# Patient Record
Sex: FEMALE | Race: WHITE | URBAN - METROPOLITAN AREA
[De-identification: names, ages, dates, MRNs, and addresses within clinical notes are randomized per-mention and may not be internally consistent; named-entity substitution may affect disease eponyms.]

---

## 2020-02-21 ENCOUNTER — RECORDS - HEALTHEAST (OUTPATIENT)
Dept: LAB | Facility: CLINIC | Age: 35
End: 2020-02-21

## 2020-02-21 LAB
ALBUMIN UR-MCNC: NEGATIVE MG/DL
ANTIBODY SCREEN: NEGATIVE
APPEARANCE UR: CLEAR
BASOPHILS # BLD AUTO: 0 THOU/UL (ref 0–0.2)
BASOPHILS NFR BLD AUTO: 0 % (ref 0–2)
BILIRUB UR QL STRIP: NEGATIVE
COLOR UR AUTO: YELLOW
EOSINOPHIL # BLD AUTO: 0.2 THOU/UL (ref 0–0.4)
EOSINOPHIL NFR BLD AUTO: 2 % (ref 0–6)
ERYTHROCYTE [DISTWIDTH] IN BLOOD BY AUTOMATED COUNT: 13.3 % (ref 11–14.5)
GLUCOSE UR STRIP-MCNC: NEGATIVE MG/DL
HCT VFR BLD AUTO: 42.8 % (ref 35–47)
HGB BLD-MCNC: 14.4 G/DL (ref 12–16)
HGB UR QL STRIP: NEGATIVE
HIV 1+2 AB+HIV1 P24 AG SERPL QL IA: NEGATIVE
KETONES UR STRIP-MCNC: NEGATIVE MG/DL
LEUKOCYTE ESTERASE UR QL STRIP: NEGATIVE
LYMPHOCYTES # BLD AUTO: 3.1 THOU/UL (ref 0.8–4.4)
LYMPHOCYTES NFR BLD AUTO: 30 % (ref 20–40)
MCH RBC QN AUTO: 29.8 PG (ref 27–34)
MCHC RBC AUTO-ENTMCNC: 33.6 G/DL (ref 32–36)
MCV RBC AUTO: 88 FL (ref 80–100)
MONOCYTES # BLD AUTO: 0.8 THOU/UL (ref 0–0.9)
MONOCYTES NFR BLD AUTO: 8 % (ref 2–10)
NEUTROPHILS # BLD AUTO: 6 THOU/UL (ref 2–7.7)
NEUTROPHILS NFR BLD AUTO: 59 % (ref 50–70)
NITRATE UR QL: NEGATIVE
PH UR STRIP: 5.5 [PH] (ref 4.5–8)
PLATELET # BLD AUTO: 343 THOU/UL (ref 140–440)
PMV BLD AUTO: 10 FL (ref 8.5–12.5)
RBC # BLD AUTO: 4.84 MILL/UL (ref 3.8–5.4)
SP GR UR STRIP: 1.02 (ref 1–1.03)
UROBILINOGEN UR STRIP-ACNC: NORMAL
WBC: 10.1 THOU/UL (ref 4–11)

## 2020-02-22 LAB
ABO/RH(D): NORMAL
ABORH REPEAT: NORMAL
HBV SURFACE AG SERPL QL IA: NEGATIVE
T PALLIDUM AB SER QL: NEGATIVE

## 2020-02-23 LAB — HBA1C MFR BLD: 5.4 % (ref 4.2–6.1)

## 2020-02-24 LAB
25(OH)D3 SERPL-MCNC: 36.7 NG/ML (ref 30–80)
C TRACH DNA SPEC QL PROBE+SIG AMP: NEGATIVE
HCV AB SERPL QL IA: NEGATIVE
HSV1 IGG SERPL QL IA: NEGATIVE
HSV2 IGG SERPL QL IA: NEGATIVE
N GONORRHOEA DNA SPEC QL NAA+PROBE: NEGATIVE

## 2020-05-21 ENCOUNTER — PRE VISIT (OUTPATIENT)
Dept: MATERNAL FETAL MEDICINE | Facility: CLINIC | Age: 35
End: 2020-05-21

## 2020-05-21 ENCOUNTER — TELEPHONE (OUTPATIENT)
Dept: MATERNAL FETAL MEDICINE | Facility: CLINIC | Age: 35
End: 2020-05-21

## 2020-05-21 DIAGNOSIS — O35.12X0 TRISOMY 18 OF FETUS IN CURRENT PREGNANCY, SINGLE OR UNSPECIFIED FETUS: Primary | ICD-10-CM

## 2020-05-21 RX ORDER — PNV NO.95/FERROUS FUM/FOLIC AC 28MG-0.8MG
1 TABLET ORAL DAILY
COMMUNITY

## 2020-05-21 NOTE — TELEPHONE ENCOUNTER
Phone call to Alyson to discuss future appointments. Plan for fetal echo on 6/5 at 0900 in the echo lab, [facility], 1100 MF ultrasound (Dr. Johnson on service and will see), JAYMIE to [facility] midwives TBD, request for visit initiated. Pt agrees to plan. Writer will email map and directions for 6/5 visits.      Radha Ward RN    [id number] Follow up phone call to Alyson confirming visit for 6/5. Visitor policy reviewed. Email with map sent.      Radha Ward RN

## 2020-05-28 ENCOUNTER — TELEPHONE (OUTPATIENT)
Dept: OBGYN | Facility: CLINIC | Age: 35
End: 2020-05-28

## 2020-05-28 NOTE — TELEPHONE ENCOUNTER
----- Message from YOVANI Cisneros CNM sent at 5/28/2020 11:44 AM CDT -----  Hi,  This is a patient who has been seen at [facility] for US and new diagnosis of trisomy 18 and multiple fetal anomalies.  At this point, she wants palliative care and will continue to see Dr. Kaia Johnson for all [facility] USs going forward.  She wants midwifery care for prenatal care and birth.  She has appointments scheduled in the morning 6/5.  Perhaps a visit with Ana Rosa Mccormick on that day?   Can you also connect with the care coordinators at [facility]?    Thanks!  Laxmi

## 2020-05-28 NOTE — TELEPHONE ENCOUNTER
Call to Radha MISHRA at [facility] who confirmed pt is looking to continue prenatal care with [facility] midwives -- fetus with Trisomy 18 planning palliative care. Will coordinate JAYMIE appointment in clinic same day as [facility] (June 5th).    Call to patient to arrange but reached voicemail. Left message to call triage.

## 2020-06-05 ENCOUNTER — HOSPITAL ENCOUNTER (OUTPATIENT)
Dept: CARDIOLOGY | Facility: CLINIC | Age: 35
End: 2020-06-05
Attending: OBSTETRICS & GYNECOLOGY
Payer: COMMERCIAL

## 2020-06-05 ENCOUNTER — TELEPHONE (OUTPATIENT)
Dept: MATERNAL FETAL MEDICINE | Facility: CLINIC | Age: 35
End: 2020-06-05

## 2020-06-05 ENCOUNTER — HOSPITAL ENCOUNTER (OUTPATIENT)
Dept: ULTRASOUND IMAGING | Facility: CLINIC | Age: 35
End: 2020-06-05
Attending: OBSTETRICS & GYNECOLOGY
Payer: COMMERCIAL

## 2020-06-05 ENCOUNTER — OFFICE VISIT (OUTPATIENT)
Dept: OBGYN | Facility: CLINIC | Age: 35
End: 2020-06-05
Attending: MIDWIFE
Payer: COMMERCIAL

## 2020-06-05 ENCOUNTER — OFFICE VISIT (OUTPATIENT)
Dept: MATERNAL FETAL MEDICINE | Facility: CLINIC | Age: 35
End: 2020-06-05
Attending: OBSTETRICS & GYNECOLOGY
Payer: COMMERCIAL

## 2020-06-05 DIAGNOSIS — O35.8XX0 MULTIPLE CONGENITAL ANOMALIES OF FETUS ON PRENATAL ULTRASOUND: ICD-10-CM

## 2020-06-05 DIAGNOSIS — O35.12X0 TRISOMY 18 OF FETUS IN CURRENT PREGNANCY, SINGLE OR UNSPECIFIED FETUS: ICD-10-CM

## 2020-06-05 DIAGNOSIS — O35.12X0 FETAL TRISOMY 18 AFFECTING CARE OF MOTHER, ANTEPARTUM, SINGLE OR UNSPECIFIED FETUS: Primary | ICD-10-CM

## 2020-06-05 DIAGNOSIS — O09.892 ENCOUNTER FOR SUPERVISION OF HIGH RISK PREGNANCY DUE TO FETAL ANOMALY, SECOND TRIMESTER: ICD-10-CM

## 2020-06-05 PROBLEM — Z80.0 FAMILY HISTORY OF COLON CANCER: Status: ACTIVE | Noted: 2018-09-19

## 2020-06-05 PROCEDURE — 76820 UMBILICAL ARTERY ECHO: CPT | Performed by: OBSTETRICS & GYNECOLOGY

## 2020-06-05 PROCEDURE — G0463 HOSPITAL OUTPT CLINIC VISIT: HCPCS | Mod: ZF

## 2020-06-05 PROCEDURE — 76816 OB US FOLLOW-UP PER FETUS: CPT

## 2020-06-05 PROCEDURE — 93325 DOPPLER ECHO COLOR FLOW MAPG: CPT

## 2020-06-05 ASSESSMENT — MIFFLIN-ST. JEOR: SCORE: 1637.53

## 2020-06-05 NOTE — NURSING NOTE
Chief Complaint   Patient presents with     Prenatal Care     24w2d       See DAVION Oliveira 6/5/2020

## 2020-06-05 NOTE — PROGRESS NOTES
[facility]   Heart Center Fetal Consult Note    Patient:  Alyson Horowitz MRN:  [id number]   YOB: 1985 Age:  35 year old   Date of Visit:  2020 PCP:  No primary care provider on file.     Dear Dr. Johnson:    I had the pleasure of seeing Alyson Horowitz at the [facility] on 2020 in fetal cardiology consultation for fetal echocardiogram results. She presented today accompanied by herself. As you know, she is a 35 year old  at 24w2d who presented for fetal echocardiogram today because of trisomy 18 and suspected congenital heart disease.    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 Mildly hypoplastic left artioventricular valve (measures 0.44 cm with a z-score of -2.76). Mild to moderate right atrioventricular valve insufficiency. Large inlet ventricular septal defect with outlet extension and large primum defect. The right ventricle is mildly dilated with normal systolic function. The left ventricle has normal systolic function. Double outlet right ventricle with normally related great arteries ,subaortic conus, and mild to moderately hypoplastic aortic valve (measures 2.4 cm with a z-score of -3.9). The pulmonary valve leaflets are mildly thickend with a peak velocity of 1.5 m/sec and mild insufficiency. The aortic arch is likely interrupted versus severely hypoplastic transverse arch.     With the help of diagrams, I reviewed the echo findings today with Alyson Horowitz. I discussed the fetal cardiac anatomy, function, and physiology. In the setting of trisomy 18, I explained that each case is discussed in a multidisciplinary team to determine if surgical option is an option. This cardiac diagnosis would require surgery within the first week of life; and may require additional surgeries if this is a  staged approach. The prognosis of the surgery would be guarded in the setting of trisomy 18. At this time, based on my discussion with Alyson, she is gathering information of the overall wellbeing of her baby, and is most likely considering comfort care. I discussed the above findings with Dr. Johnson, and recommended no follow up at this time. If the family changes their mind from comfort care to wanting to evaluate for possible intervention, please let us know so we can schedule follow up. She had appropriate questions which I addressed. I provided her with my direct contact information for additional questions after they left.    Thank you for allowing me to participate in Alyson's care. Please do not hesitate to contact me with questions or concerns.    This visit was separate from the performance and interpretation of the ultrasound. The majority of the time (>50%) was spent in counseling and coordination of care. I spent approximately 55 minutes in face-to-face time reviewing the above considerations.    Rony Garcia M.D.  Pediatric Cardiology  [facility] [address], 5th floor, [facility] [address]  Phone [phone]  Fax [phone]

## 2020-06-05 NOTE — PROGRESS NOTES
+Transfer of Care  SUBJECTIVE  35 year old woman presents to clinic for transfer of OB care appointment.    Patient's last menstrual period was 2019.  at 24w2d by Estimated Date of Delivery: Sep 23, 2020 based on 6w1d ultrasound on 2020.     - Feels well overall physically. Had nausea and vomiting early pregnancy but is resolved.   - Pt was receiving prenatal care from [facility]. Initiated prenatal care at 9 weeks, has had 3 visit total. She did a home birth with this midwifery service in  and has a relationship with [facility]. If COVID restrictions change, midwife may be willing to provide emotional support during labor/birth.       - Reason for transfer to [facility] care- multiple fetal anomalies seen on ultrasound, trisomy 18 confirmed. Seeing Dr. Johnson at [facility], but would prefer midwifery birth.   - Pt had US at [facility] and echocardiogram today which provided insight into prognosis. States she plans to discuss results with , but they will likely plan palliative care.   -Level II Ultrasound abnormal results, follow up per [facility] for trisomy 18.   - Pre pregnancy BMI 29.87.   Pre Pregnancy Weight 185lbs.  Height 66 inches.    Paper prenatal records received via fax, reviewed. After review of prenatal records, additional routine orders are recommended including: Hep B antibody    US 3020: 6w1d intrauterine pregnancy   Labs 2020:  O positive  Adelaida screen: negative  HIV: non-reactive  Hep B Ag: non-reactive    Hep C Ab: non-reactive  Treponema: non-reactive   Chlamydia: negative  Gonorrhea: negative  HS1: negative  HSV2: negative  HgbA1C: 5.4  Vitamin D: 36  Hgb: 14.4    GENETICS  - Genetic/Infection questionnaire completed, risks include none. Pt  does not have a recent known exposure to Parvo or CMV so IgG/IgM testing WILL NOT be ordered.     - Current Medications    Current Outpatient Medications   Medication Sig Dispense Refill     magnesium 100 MG CAPS Unknown dosage       Omega-3 Fatty  Acids (FISH OIL PO)        Prenatal Vit-Fe Fumarate-FA (PRENATAL VITAMIN) 27-0.8 MG TABS Take 1 tablet by mouth daily       Probiotic Product (PROBIOTIC PO)        VITAMIN D PO            - Co-morbids  History reviewed. No pertinent past medical history.    - Risk for GDM -  has First degree relative with GDM or DM  and Personal history of of prior macrosomia in  WILL NOT have an early GCT. Hgb A1C was 5.4 in first trimester.    - High Risk for Pre E-  Has No known risk factors of High risk for Pre E so WILL NOT start low dose aspirin (81mg) starting between 12 and 28 weeks to prevent early onset preeclampsia.    - Moderate risk - has moderate risk factors for Pre E including Age =35 years or older    Since she meets one of the moderate risk facrtors for Pre E -   so WILL NOT consider starting low dose aspirin (81mg) starting between 12 and 28 weeks to prevent early onset preeclampsia.    - The patient  does not have a history of spontaneous  birth so  WILL NOT consider progesterone starting at 16-20 weeks and/or serial transvaginal cervical length ultrasounds from 16-24 weeks.     -The patient does not have a history of immunosuppresion or HIV so Toxoplasma IgG/IgM WILL NOT be ordered.    PERSONAL/SOCIAL HISTORY  , Francesco, is involved. Pt states this is her second marriage and his first child. Became teary and states I just wish he didn't have to go through this as his first pregnancy experience.   Lives with Francesco and her 3 children.  Employment: Full time as an . Her job involves light activity. Currently working from home.  History of anxiety. No meds or current therapist. Used couples therapist in past. Feels she has many good coping mechanisms in place and strong support system.   Has friends from [place] that are supportive. One friend which went through a very similar experience last year and recommended Dr. Johnson and [facility] for the birth.  Additional items: Discussed  clinic mental health resources. Pt was given a referral for a grief counselor that was highly recommended by her friend. Plans to connect and establish care during pregnancy.     PSYCHIATRIC:  Denies depression, panic attacks or post partum depression.  Has History of anxiety  PHQ-9 score:  No flowsheet data found.  No flowsheet data found.      Past History:  Her past medical history History reviewed. No pertinent past medical history.   Her past pregnancies have been uncomplicated  Since her last LMP she denies use of alcohol, tobacco and street drugs.  HISTORY:  Family History   Problem Relation Age of Onset     Thyroid Disease Mother      Diabetes Type 2  Father         on insulin     Cerebral palsy Sister      Cancer Maternal Grandmother         Pelvic     Parkinsonism Maternal Grandfather      Diabetes Type 2  Paternal Grandmother         on insulin     Colon Cancer Sister 36     Social History     Socioeconomic History     Marital status:      Spouse name: Not on file     Number of children: Not on file     Years of education: Not on file     Highest education level: Not on file   Occupational History     Not on file   Social Needs     Financial resource strain: Not on file     Food insecurity     Worry: Not on file     Inability: Not on file     Transportation needs     Medical: Not on file     Non-medical: Not on file   Tobacco Use     Smoking status: Not on file   Substance and Sexual Activity     Alcohol use: Not on file     Drug use: Not on file     Sexual activity: Not on file   Lifestyle     Physical activity     Days per week: Not on file     Minutes per session: Not on file     Stress: Not on file   Relationships     Social connections     Talks on phone: Not on file     Gets together: Not on file     Attends Jain service: Not on file     Active member of club or organization: Not on file     Attends meetings of clubs or organizations: Not on file     Relationship status: Not on file      "Intimate partner violence     Fear of current or ex partner: Not on file     Emotionally abused: Not on file     Physically abused: Not on file     Forced sexual activity: Not on file   Other Topics Concern     Not on file   Social History Narrative     Not on file     Current Outpatient Medications   Medication Sig     magnesium 100 MG CAPS Unknown dosage     Omega-3 Fatty Acids (FISH OIL PO)      Prenatal Vit-Fe Fumarate-FA (PRENATAL VITAMIN) 27-0.8 MG TABS Take 1 tablet by mouth daily     Probiotic Product (PROBIOTIC PO)      VITAMIN D PO      No current facility-administered medications for this visit.      No Known Allergies    ============================================  MEDICAL HISTORY  History reviewed. No pertinent past medical history.  Past Surgical History:   Procedure Laterality Date     NO HISTORY OF SURGERY         OB History    Para Term  AB Living   4 3 3 0 0 3   SAB TAB Ectopic Multiple Live Births   0 0 0 0 3      # Outcome Date GA Lbr Olivier/2nd Weight Sex Delivery Anes PTL Lv   4 Current            3 Term 04/19/15 37w0d  4.054 kg (8 lb 15 oz) M    JESSICA   2 Term 11 37w3d  3.1 kg (6 lb 13.4 oz) F    JESSICA      Name: ANNBABY GIRL      Apgar1: 7  Apgar5: 8   1 Term 09 40w0d  3.515 kg (7 lb 12 oz) M    JESSICA       GYN History- Abnormal Pap Smears- reports last 2018, no hx of abnormals                        Cervical procedures: none                        History of STI: none    I personally reviewed the past social/family/medical and surgical history on the date of service.       ROS: 10 point ROS neg other than the symptoms noted above in the HPI.    Objective  BP (P) 112/62 (BP Location: Left arm, Patient Position: Chair)   Pulse (P) 74   Ht 1.676 m (5' 6\")   Wt (P) 93.1 kg (205 lb 3.2 oz)   LMP 2019   Breastfeeding No   BMI (P) 33.12 kg/m     EXAM:  GENERAL:  Pleasant pregnant female, alert, cooperative and well groomed.  SKIN:  Warm and dry, " without lesions or rashes  HEAD: Symmetrical features.  MOUTH:  Buccal mucosa pink, moist without lesions.  Teeth in good repair.    NECK:  Thyroid without enlargement and nodules.  Lymph nodes not palpable.   LUNGS:  Clear to auscultation.  BREAST:    Deferred  HEART:  RRR without murmur.  ABDOMEN: Soft without masses , tenderness or organomegaly.  No CVA tenderness.  Uterus palpable at size equal to dates.  No scars noted.. Fetal heart tones present.  MUSCULOSKELETAL:  Full range of motion  EXTREMITIES:  No edema. No significant varicosities.   PELVIC EXAM: Deferred  WET PREP:Not done  GC/CHLAMYDIA CULTURE OBTAINED: negative   No results found for: PAP     Assessment/Plan  - 35 year old , 24w2d weeks of pregnancy with MARY of Sep 23, 2020 by US confirms  - Intrauterine pregnancy 24w2d size consistent with dates  - FISH Aneuploidy Screen: trisomy 18  - Ultrasound with multiple fetal anomalies including micrognathia, bilateral cleft lip and palate, hypoplastic nasal bone, large bilateral choroid plexus cysts, right hand persistently clenched with overlapping digits, left hand persistently flexed at the wrist, rocker bottom feet, small fetal stomach, complex congenital heart defects   - Normal exam     No orders of the defined types were placed in this encounter.    - Oriented to Practice, types of care, and how to reach a provider.  Pt prefers CNM team  - Educational handout on the prevention of infections diseases during pregnancy provided.  - Reviewed healthy diet and foods to avoid, exercise and activity during pregnancy; avoiding exposure to toxoplasmosis; and maintenance of a generally healthy lifestyle.   - Discussed the harms, benefits, side effects and alternative therapies for current prescribed and OTC medications. Patient was encouraged to start prenatal vitamins as tolerated.    - Reviewed use of triage nurse line and contacting the on-call provider after hours for an urgent need such as fever,  vagina bleeding, bladder or vaginal infection, rupture of membranes,  or term labor.    - Reviewed best evidence for: weight gain for her weight and height for pregnancy:  Based on pre-pregnancy BMI of 29. RECOMMENDED WEIGHT GAIN: 15-25 lbs.  - All pt's questions discussed and answered.  Pt verbalized understanding of and agreement to plan of care.   - Pregnancy concerns to be addressed by provider at next OB visit include:   - Pt had echocardiogram and repeat US at [facility] this morning. Plans to discuss with . Thinks they will plan palliative care. Continue discussion as needed.    - RTC for Extended OB visit in 3 weeks with labs including GCT and hepatitis B antibody.     YOVANI SkyM

## 2020-06-05 NOTE — TELEPHONE ENCOUNTER
Message left for Alyson regarding appt scheduled for 6/25 at  [facility] at 0930.  Ok for spouse to come.  notified and orders faxed.      Radha Ward RN

## 2020-06-05 NOTE — LETTER
2020       RE: Alyson Horowitz  [address] [place] [address]     Dear Colleague,    Thank you for referring your patient, Alyson Horowitz, to the [facility] at [facility]. Please see a copy of my visit note below.    +Transfer of Care  SUBJECTIVE  35 year old woman presents to clinic for transfer of OB care appointment.    Patient's last menstrual period was 2019.  at 24w2d by Estimated Date of Delivery: Sep 23, 2020 based on 6w1d ultrasound on 2020.     - Feels well overall physically. Had nausea and vomiting early pregnancy but is resolved.   - Pt was receiving prenatal care from [facility]. Initiated prenatal care at 9 weeks, has had 3 visit total. She did a home birth with this midwifery service in  and has a relationship with [facility]. If COVID restrictions change, midwife may be willing to provide emotional support during labor/birth.       - Reason for transfer to [facility] care- multiple fetal anomalies seen on ultrasound, trisomy 18 confirmed. Seeing Dr. Johnson at [facility], but would prefer midwifery birth.   - Pt had US at [facility] and echocardiogram today which provided insight into prognosis. States she plans to discuss results with , but they will likely plan palliative care.   -Level II Ultrasound abnormal results, follow up per [facility] for trisomy 18.   - Pre pregnancy BMI 29.87.   Pre Pregnancy Weight 185lbs.  Height 66 inches.    Paper prenatal records received via fax, reviewed. After review of prenatal records, additional routine orders are recommended including: Hep B antibody    US 3020: 6w1d intrauterine pregnancy   Labs 2020:  O positive  Adelaida screen: negative  HIV: non-reactive  Hep B Ag: non-reactive    Hep C Ab: non-reactive  Treponema: non-reactive   Chlamydia: negative  Gonorrhea: negative  HS1: negative  HSV2: negative  HgbA1C: 5.4  Vitamin D: 36  Hgb: 14.4    GENETICS  - Genetic/Infection  questionnaire completed, risks include none. Pt  does not have a recent known exposure to Parvo or CMV so IgG/IgM testing WILL NOT be ordered.     - Current Medications    Current Outpatient Medications   Medication Sig Dispense Refill     magnesium 100 MG CAPS Unknown dosage       Omega-3 Fatty Acids (FISH OIL PO)        Prenatal Vit-Fe Fumarate-FA (PRENATAL VITAMIN) 27-0.8 MG TABS Take 1 tablet by mouth daily       Probiotic Product (PROBIOTIC PO)        VITAMIN D PO            - Co-morbids  History reviewed. No pertinent past medical history.    - Risk for GDM -  has First degree relative with GDM or DM  and Personal history of of prior macrosomia in  WILL NOT have an early GCT. Hgb A1C was 5.4 in first trimester.    - High Risk for Pre E-  Has No known risk factors of High risk for Pre E so WILL NOT start low dose aspirin (81mg) starting between 12 and 28 weeks to prevent early onset preeclampsia.    - Moderate risk - has moderate risk factors for Pre E including Age =35 years or older    Since she meets one of the moderate risk facrtors for Pre E -   so WILL NOT consider starting low dose aspirin (81mg) starting between 12 and 28 weeks to prevent early onset preeclampsia.    - The patient  does not have a history of spontaneous  birth so  WILL NOT consider progesterone starting at 16-20 weeks and/or serial transvaginal cervical length ultrasounds from 16-24 weeks.     -The patient does not have a history of immunosuppresion or HIV so Toxoplasma IgG/IgM WILL NOT be ordered.    PERSONAL/SOCIAL HISTORY  , Francesco, is involved. Pt states this is her second marriage and his first child. Became teary and states I just wish he didn't have to go through this as his first pregnancy experience.   Lives with Francesco and her 3 children.  Employment: Full time as an . Her job involves light activity. Currently working from home.  History of anxiety. No meds or current therapist. Used couples  therapist in past. Feels she has many good coping mechanisms in place and strong support system.   Has friends from [place] that are supportive. One friend which went through a very similar experience last year and recommended Dr. Johnson and GREGOR Mckenzie for the birth.  Additional items: Discussed clinic mental health resources. Pt was given a referral for a grief counselor that was highly recommended by her friend. Plans to connect and establish care during pregnancy.     PSYCHIATRIC:  Denies depression, panic attacks or post partum depression.  Has History of anxiety  PHQ-9 score:  No flowsheet data found.  No flowsheet data found.      Past History:  Her past medical history History reviewed. No pertinent past medical history.   Her past pregnancies have been uncomplicated  Since her last LMP she denies use of alcohol, tobacco and street drugs.  HISTORY:  Family History   Problem Relation Age of Onset     Thyroid Disease Mother      Diabetes Type 2  Father         on insulin     Cerebral palsy Sister      Cancer Maternal Grandmother         Pelvic     Parkinsonism Maternal Grandfather      Diabetes Type 2  Paternal Grandmother         on insulin     Colon Cancer Sister 36     Social History     Socioeconomic History     Marital status:      Spouse name: Not on file     Number of children: Not on file     Years of education: Not on file     Highest education level: Not on file   Occupational History     Not on file   Social Needs     Financial resource strain: Not on file     Food insecurity     Worry: Not on file     Inability: Not on file     Transportation needs     Medical: Not on file     Non-medical: Not on file   Tobacco Use     Smoking status: Not on file   Substance and Sexual Activity     Alcohol use: Not on file     Drug use: Not on file     Sexual activity: Not on file   Lifestyle     Physical activity     Days per week: Not on file     Minutes per session: Not on file     Stress: Not on file    Relationships     Social connections     Talks on phone: Not on file     Gets together: Not on file     Attends Protestant service: Not on file     Active member of club or organization: Not on file     Attends meetings of clubs or organizations: Not on file     Relationship status: Not on file     Intimate partner violence     Fear of current or ex partner: Not on file     Emotionally abused: Not on file     Physically abused: Not on file     Forced sexual activity: Not on file   Other Topics Concern     Not on file   Social History Narrative     Not on file     Current Outpatient Medications   Medication Sig     magnesium 100 MG CAPS Unknown dosage     Omega-3 Fatty Acids (FISH OIL PO)      Prenatal Vit-Fe Fumarate-FA (PRENATAL VITAMIN) 27-0.8 MG TABS Take 1 tablet by mouth daily     Probiotic Product (PROBIOTIC PO)      VITAMIN D PO      No current facility-administered medications for this visit.      No Known Allergies    ============================================  MEDICAL HISTORY  History reviewed. No pertinent past medical history.  Past Surgical History:   Procedure Laterality Date     NO HISTORY OF SURGERY         OB History    Para Term  AB Living   4 3 3 0 0 3   SAB TAB Ectopic Multiple Live Births   0 0 0 0 3      # Outcome Date GA Lbr Olivier/2nd Weight Sex Delivery Anes PTL Lv   4 Current            3 Term 04/19/15 37w0d  4.054 kg (8 lb 15 oz) M    JESSICA   2 Term 11 37w3d  3.1 kg (6 lb 13.4 oz) F    JESSICA      Name: NAN,BABY GIRL      Apgar1: 7  Apgar5: 8   1 Term 09 40w0d  3.515 kg (7 lb 12 oz) M    JESSICA       GYN History- Abnormal Pap Smears- reports last 2018, no hx of abnormals                        Cervical procedures: none                        History of STI: none    I personally reviewed the past social/family/medical and surgical history on the date of service.       ROS: 10 point ROS neg other than the symptoms noted above in the HPI.    Objective  BP  "(P) 112/62 (BP Location: Left arm, Patient Position: Chair)   Pulse (P) 74   Ht 1.676 m (5' 6\")   Wt (P) 93.1 kg (205 lb 3.2 oz)   LMP 2019   Breastfeeding No   BMI (P) 33.12 kg/m     EXAM:  GENERAL:  Pleasant pregnant female, alert, cooperative and well groomed.  SKIN:  Warm and dry, without lesions or rashes  HEAD: Symmetrical features.  MOUTH:  Buccal mucosa pink, moist without lesions.  Teeth in good repair.    NECK:  Thyroid without enlargement and nodules.  Lymph nodes not palpable.   LUNGS:  Clear to auscultation.  BREAST:    Deferred  HEART:  RRR without murmur.  ABDOMEN: Soft without masses , tenderness or organomegaly.  No CVA tenderness.  Uterus palpable at size equal to dates.  No scars noted.. Fetal heart tones present.  MUSCULOSKELETAL:  Full range of motion  EXTREMITIES:  No edema. No significant varicosities.   PELVIC EXAM: Deferred  WET PREP:Not done  GC/CHLAMYDIA CULTURE OBTAINED: negative   No results found for: PAP     Assessment/Plan  - 35 year old , 24w2d weeks of pregnancy with MARY of Sep 23, 2020 by US confirms  - Intrauterine pregnancy 24w2d size consistent with dates  - FISH Aneuploidy Screen: trisomy 18  - Ultrasound with multiple fetal anomalies including micrognathia, bilateral cleft lip and palate, hypoplastic nasal bone, large bilateral choroid plexus cysts, right hand persistently clenched with overlapping digits, left hand persistently flexed at the wrist, rocker bottom feet, small fetal stomach, complex congenital heart defects   - Normal exam     No orders of the defined types were placed in this encounter.    - Oriented to Practice, types of care, and how to reach a provider.  Pt prefers CNM team  - Educational handout on the prevention of infections diseases during pregnancy provided.  - Reviewed healthy diet and foods to avoid, exercise and activity during pregnancy; avoiding exposure to toxoplasmosis; and maintenance of a generally healthy lifestyle.   - " Discussed the harms, benefits, side effects and alternative therapies for current prescribed and OTC medications. Patient was encouraged to start prenatal vitamins as tolerated.    - Reviewed use of triage nurse line and contacting the on-call provider after hours for an urgent need such as fever, vagina bleeding, bladder or vaginal infection, rupture of membranes,  or term labor.    - Reviewed best evidence for: weight gain for her weight and height for pregnancy:  Based on pre-pregnancy BMI of 29. RECOMMENDED WEIGHT GAIN: 15-25 lbs.  - All pt's questions discussed and answered.  Pt verbalized understanding of and agreement to plan of care.   - Pregnancy concerns to be addressed by provider at next OB visit include:   - Pt had echocardiogram and repeat US at [facility] this morning. Plans to discuss with . Thinks they will plan palliative care. Continue discussion as needed.    - RTC for Extended OB visit in 3 weeks with labs including GCT and hepatitis B antibody.     YOVANI SkyM

## 2020-06-08 DIAGNOSIS — O35.12X0 TRISOMY 18 OF FETUS IN CURRENT PREGNANCY, SINGLE OR UNSPECIFIED FETUS: Primary | ICD-10-CM

## 2020-06-17 ENCOUNTER — VIRTUAL VISIT (OUTPATIENT)
Dept: MATERNAL FETAL MEDICINE | Facility: CLINIC | Age: 35
End: 2020-06-17
Attending: OBSTETRICS & GYNECOLOGY
Payer: COMMERCIAL

## 2020-06-17 DIAGNOSIS — O35.12X0 TRISOMY 18 OF FETUS IN CURRENT PREGNANCY, SINGLE OR UNSPECIFIED FETUS: ICD-10-CM

## 2020-06-17 PROBLEM — O28.5 ABNORMAL CHROMOSOMAL AND GENETIC FINDING ON ANTENATAL SCREENING MOTHER: Status: ACTIVE | Noted: 2020-06-17

## 2020-06-17 NOTE — PROGRESS NOTES
"Alyson Horowitz is a 35 year old female who is being evaluated via a billable telephone visit.      The patient has been notified of following:     \"This telephone visit will be conducted via a call between you and your physician/provider. We have found that certain health care needs can be provided without the need for a physical exam.  This service lets us provide the care you need with a short phone conversation.  If a prescription is necessary we can send it directly to your pharmacy.  If lab work is needed we can place an order for that and you can then stop by our lab to have the test done at a later time.    Telephone visits are billed at different rates depending on your insurance coverage. During this emergency period, for some insurers they may be billed the same as an in-person visit.  Please reach out to your insurance provider with any questions.    If during the course of the call the physician/provider feels a telephone visit is not appropriate, you will not be charged for this service.\"    Patient has given verbal consent for Telephone visit?  Yes    What phone number would you like to be contacted at? Email patient at [email or web address] to send link to join call.    How would you like to obtain your AVS? [employer]    Video call duration: 40 minutes.    See separate visit dictation for details of the virtual visit.    Miriam Harman MD  Attending Neonatologist  [facility] NICU  "

## 2020-06-25 ENCOUNTER — TRANSFERRED RECORDS (OUTPATIENT)
Dept: HEALTH INFORMATION MANAGEMENT | Facility: CLINIC | Age: 35
End: 2020-06-25

## 2020-06-26 VITALS
DIASTOLIC BLOOD PRESSURE: 62 MMHG | WEIGHT: 205.2 LBS | HEIGHT: 66 IN | SYSTOLIC BLOOD PRESSURE: 112 MMHG | BODY MASS INDEX: 32.98 KG/M2

## 2020-07-07 NOTE — PROGRESS NOTES
Visit Date:   2020      MATERNAL FETAL MEDICINE CLINIC VISIT       I had the pleasure of meeting your patient through [facility] for a virtual visit with Ms. Alyson Horowitz and her partner, Francesco, on 2020 at the request of aKia Johnson of the Maternal Fetal Medicine Service at [facility].  Also present during the virtual video visit was Ms. Magdalena Woods,  social worker.  Ms. Horowitz is currently pregnant at 26 weeks 0 days post-menstrual age with a baby boy, Giuseppe, who has been diagnosed with trisomy 18.  Given the diagnosis of trisomy 18 which has been confirmed by amniocentesis and the complex associated anomalies, this is felt to be a lethal diagnosis.  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  Alyson and her , Francesco, have clearly thought about the plans for care and at this point their plans for care include comfort cares for Giuseppe following his birth.      We are saddened that we do not have life-sustaining measures for Giuseppe given this likely lethal trisomy 18.  We presented to Ms. Horowitz and her  a template for a birth plan to include palliative care for Giuseppe following his birth and for the time that he is alive to be able to spend with him and with him as comfortable as possible.  We do not anticipate having a  resuscitation team present at the delivery.  Our goal is to have a meaningful experience for them and for their extended family.  This will include physical comfort measures  for Giuseppe, including medications if indicated, and a do not resuscitate order will be in place per their request.  They will also plan to decline routine  cares, including vitamin K, erythromycin eye ointment,  screening, any other lab testing.  We did discuss that in the event Giuseppe's breathing is adequate, reassessments will be done and if his condition is still felt to be lethal, but if he is alive at the time of mother's discharge, then consideration for home going with hospice can also be given.  We discussed ways to facilitate having their family available and what we will do to have exceptions to current visiting regulations given the COVID-19 pandemic, including how to consider having Giuseppe's siblings (10-year-old Demetri, 9-year-old Keisha, 5-year-old Neno) involved after Giuseppe's birth.      Ms. Woods and KAI answered their questions and provided our contact information, inviting them to be in touch with any questions that should arise after this virtual visit.  They will be in touch subsequent to this visit with Ms. Woods as the birth plan is finalized over the next few weeks.  The tentative plans are to await spontaneous labor, and the estimated date of delivery is 2020.      We greatly appreciate the opportunity to meet with this kind and thoughtful couple.  We look forward to caring for them and Giuseppe during this extremely difficult time for their whole family.      Time spent was 40 minutes, with 100% of the time spent in direct patient counseling via video virtual visit.         Sincerely,      BARBI LAM MD             D: 2020   T: 2020   MT: LEANDER      Name:     PAOLO COTA   MRN:      [id number]        Account:      [id number]   :      1985           Visit Date:   2020      Document: [id number]       cc: Mirlande Johnson MD

## 2020-07-08 ENCOUNTER — TRANSFERRED RECORDS (OUTPATIENT)
Dept: HEALTH INFORMATION MANAGEMENT | Facility: CLINIC | Age: 35
End: 2020-07-08

## 2020-07-27 ENCOUNTER — HOSPITAL ENCOUNTER (OUTPATIENT)
Dept: ULTRASOUND IMAGING | Facility: CLINIC | Age: 35
End: 2020-07-27
Attending: OBSTETRICS & GYNECOLOGY
Payer: COMMERCIAL

## 2020-07-27 ENCOUNTER — APPOINTMENT (OUTPATIENT)
Dept: MATERNAL FETAL MEDICINE | Facility: CLINIC | Age: 35
End: 2020-07-27
Attending: OBSTETRICS & GYNECOLOGY
Payer: COMMERCIAL

## 2020-07-27 ENCOUNTER — OFFICE VISIT (OUTPATIENT)
Dept: CARE COORDINATION | Facility: CLINIC | Age: 35
End: 2020-07-27

## 2020-07-27 DIAGNOSIS — O28.5 ABNORMAL CHROMOSOMAL AND GENETIC FINDING ON ANTENATAL SCREENING MOTHER: ICD-10-CM

## 2020-07-27 DIAGNOSIS — Z71.9 ENCOUNTER FOR COUNSELING: Primary | ICD-10-CM

## 2020-07-27 PROCEDURE — 76816 OB US FOLLOW-UP PER FETUS: CPT

## 2020-07-27 PROCEDURE — 76820 UMBILICAL ARTERY ECHO: CPT | Performed by: OBSTETRICS & GYNECOLOGY

## 2020-07-27 PROCEDURE — 59025 FETAL NON-STRESS TEST: CPT | Mod: 59,ZF

## 2020-07-27 PROCEDURE — 59025 FETAL NON-STRESS TEST: CPT | Mod: ZF | Performed by: OBSTETRICS & GYNECOLOGY

## 2020-07-27 NOTE — PROGRESS NOTES
Patient and spouse known to KANDICE from  palliative care consult with Dr. Maddy Harman on 20.  Please see Dr. Harman's documentation for this visit.     Since our virtual  palliative care visit,  Alyson and Francesco have created a plan for a home birth.   This plan enables their other children and Francesco's parents to be present at the time of birth to meet Giuseppe.      KANDICE facilitated referral on to [facility] program - [place] [phone].  Alyson and Francesco along with their midwife met with the hospice program last week to walk through their wishes for their birth experience and comfort care for Giuseppe.    KANDICE had previously sent Alyson sample birth plans via email.  Alyson reports today that she has not written out a  birth plan but her midwife is aware of their wishes.      Alyson continues to access the support from the Trisomy Pro[employer]ounder communities.  She did meet with a bereaved mother that had a baby girl with Trisomy 18 that delivered here at [facility].  She found this connection comforting.      KANDICE shared a My [employer] Bear as well as sibling bereavement resources.      Encouraged Alyson and Francesco to be in contact should they have questions or needs.

## 2020-07-27 NOTE — PROGRESS NOTES
Please see the imaging tab for details of the ultrasound performed today.    Kaia Johnson MD  Specialist in Maternal-Fetal Medicine

## 2020-08-17 ENCOUNTER — TRANSFERRED RECORDS (OUTPATIENT)
Dept: HEALTH INFORMATION MANAGEMENT | Facility: CLINIC | Age: 35
End: 2020-08-17

## 2020-08-23 ENCOUNTER — RECORDS - HEALTHEAST (OUTPATIENT)
Dept: LAB | Facility: CLINIC | Age: 35
End: 2020-08-23

## 2020-08-23 LAB
ALBUMIN UR-MCNC: NEGATIVE MG/DL
APPEARANCE UR: ABNORMAL
BACTERIA #/AREA URNS HPF: ABNORMAL HPF
BILIRUB UR QL STRIP: NEGATIVE
COLOR UR AUTO: YELLOW
GLUCOSE UR STRIP-MCNC: NEGATIVE MG/DL
HGB UR QL STRIP: NEGATIVE
HYALINE CASTS #/AREA URNS LPF: ABNORMAL LPF
KETONES UR STRIP-MCNC: NEGATIVE MG/DL
LEUKOCYTE ESTERASE UR QL STRIP: ABNORMAL
NITRATE UR QL: NEGATIVE
PH UR STRIP: 6 [PH] (ref 4.5–8)
RBC #/AREA URNS AUTO: ABNORMAL HPF
SP GR UR STRIP: 1.01 (ref 1–1.03)
SQUAMOUS #/AREA URNS AUTO: ABNORMAL LPF
URATE CRY #/AREA URNS HPF: PRESENT /[HPF]
UROBILINOGEN UR STRIP-ACNC: ABNORMAL
WBC #/AREA URNS AUTO: >100 HPF
WBC CLUMPS #/AREA URNS HPF: PRESENT /[HPF]

## 2020-08-25 LAB
BACTERIA SPEC CULT: ABNORMAL
BACTERIA SPEC CULT: ABNORMAL

## 2023-07-22 NOTE — TELEPHONE ENCOUNTER
Spoke with Alyson about transferring care. She agreed with plan and was forwarded to  to scheduling with midwife.  [facility] in [facility].   Patient requests all Lab, Cardiology, and Radiology Results on their Discharge Instructions